# Patient Record
Sex: FEMALE | Race: OTHER | Employment: FULL TIME | URBAN - METROPOLITAN AREA
[De-identification: names, ages, dates, MRNs, and addresses within clinical notes are randomized per-mention and may not be internally consistent; named-entity substitution may affect disease eponyms.]

---

## 2023-02-08 ENCOUNTER — OFFICE VISIT (OUTPATIENT)
Dept: PRIMARY CARE CLINIC | Age: 28
End: 2023-02-08
Payer: COMMERCIAL

## 2023-02-08 VITALS
SYSTOLIC BLOOD PRESSURE: 90 MMHG | BODY MASS INDEX: 22.43 KG/M2 | HEIGHT: 68 IN | WEIGHT: 148 LBS | HEART RATE: 83 BPM | RESPIRATION RATE: 16 BRPM | DIASTOLIC BLOOD PRESSURE: 58 MMHG | OXYGEN SATURATION: 97 % | TEMPERATURE: 98.7 F

## 2023-02-08 DIAGNOSIS — N92.6 IRREGULAR PERIODS: Primary | ICD-10-CM

## 2023-02-08 DIAGNOSIS — Z76.89 ENCOUNTER TO ESTABLISH CARE WITH NEW DOCTOR: ICD-10-CM

## 2023-02-08 PROCEDURE — 99203 OFFICE O/P NEW LOW 30 MIN: CPT | Performed by: FAMILY MEDICINE

## 2023-02-08 SDOH — ECONOMIC STABILITY: FOOD INSECURITY: WITHIN THE PAST 12 MONTHS, THE FOOD YOU BOUGHT JUST DIDN'T LAST AND YOU DIDN'T HAVE MONEY TO GET MORE.: NEVER TRUE

## 2023-02-08 SDOH — ECONOMIC STABILITY: INCOME INSECURITY: HOW HARD IS IT FOR YOU TO PAY FOR THE VERY BASICS LIKE FOOD, HOUSING, MEDICAL CARE, AND HEATING?: NOT HARD AT ALL

## 2023-02-08 SDOH — ECONOMIC STABILITY: FOOD INSECURITY: WITHIN THE PAST 12 MONTHS, YOU WORRIED THAT YOUR FOOD WOULD RUN OUT BEFORE YOU GOT MONEY TO BUY MORE.: NEVER TRUE

## 2023-02-08 SDOH — ECONOMIC STABILITY: HOUSING INSECURITY
IN THE LAST 12 MONTHS, WAS THERE A TIME WHEN YOU DID NOT HAVE A STEADY PLACE TO SLEEP OR SLEPT IN A SHELTER (INCLUDING NOW)?: NO

## 2023-02-08 ASSESSMENT — ENCOUNTER SYMPTOMS
DIARRHEA: 0
WHEEZING: 0
EYE PAIN: 0
ABDOMINAL PAIN: 0
ABDOMINAL DISTENTION: 0
EYE DISCHARGE: 0
RHINORRHEA: 0
CONSTIPATION: 0
BACK PAIN: 0
COUGH: 0
SHORTNESS OF BREATH: 0
VOMITING: 0
COLOR CHANGE: 0
SORE THROAT: 0
SINUS PRESSURE: 0
CHEST TIGHTNESS: 0

## 2023-02-08 ASSESSMENT — PATIENT HEALTH QUESTIONNAIRE - PHQ9
1. LITTLE INTEREST OR PLEASURE IN DOING THINGS: 0
SUM OF ALL RESPONSES TO PHQ9 QUESTIONS 1 & 2: 0
SUM OF ALL RESPONSES TO PHQ QUESTIONS 1-9: 0
2. FEELING DOWN, DEPRESSED OR HOPELESS: 0
SUM OF ALL RESPONSES TO PHQ QUESTIONS 1-9: 0

## 2023-02-08 NOTE — PROGRESS NOTES
[facility]        SUBJECTIVE:  CC: had concerns including New Patient (Pt just wants a check up, since she is [place] to the country. ). Janett Tovar presented to the clinic for a new patient visit. Reports she has been trying to get pregnant for over a year. She is following with Dr. Neeta Nassar for OBGYN. She has a history of irregular periods. Review of Systems   Constitutional:  Negative for activity change, appetite change, fatigue and fever. HENT:  Negative for congestion, postnasal drip, rhinorrhea, sinus pressure, sneezing and sore throat. Eyes:  Negative for pain and discharge. Respiratory:  Negative for cough, chest tightness, shortness of breath and wheezing. Cardiovascular:  Negative for chest pain, palpitations and leg swelling. Gastrointestinal:  Negative for abdominal distention, abdominal pain, constipation, diarrhea and vomiting. Endocrine: Negative for cold intolerance and heat intolerance. Genitourinary:  Negative for decreased urine volume, frequency and urgency. Musculoskeletal:  Negative for arthralgias and back pain. Skin:  Negative for color change and rash. Allergic/Immunologic: Negative for food allergies and immunocompromised state. Neurological:  Negative for dizziness, syncope, weakness, numbness and headaches. Psychiatric/Behavioral:  Negative for agitation and behavioral problems. The patient is not nervous/anxious. No outpatient medications have been marked as taking for the 2/8/23 encounter (Office Visit) with Jeanette Angela MD.       No past medical history on file. No past surgical history on file. No family history on file. No Known Allergies    Social History:  TOBACCO:   reports that she has never smoked. She has never been exposed to tobacco smoke. She has never used smokeless tobacco.  ETOH:   reports current alcohol use.     OBJECTIVE    VS: BP (!) 90/58   Pulse 83   Temp 98.7 °F (37.1 °C) Resp 16   Ht 5' 8\" (1.727 m)   Wt 148 lb (67.1 kg)   LMP 01/15/2023   SpO2 97%   BMI 22.50 kg/m²   Physical Exam  Constitutional:       General: She is not in acute distress. Appearance: She is well-developed. She is not diaphoretic. HENT:      Head: Normocephalic and atraumatic. Eyes:      Conjunctiva/sclera: Conjunctivae normal.      Pupils: Pupils are equal, round, and reactive to light. Cardiovascular:      Rate and Rhythm: Normal rate and regular rhythm. Pulmonary:      Effort: Pulmonary effort is normal.      Breath sounds: Normal breath sounds. Abdominal:      General: Bowel sounds are normal. There is no distension. Palpations: Abdomen is soft. Tenderness: There is no abdominal tenderness. Hernia: No hernia is present. Musculoskeletal:      Cervical back: Normal range of motion and neck supple. Skin:     General: Skin is warm and dry. Neurological:      Mental Status: She is alert and oriented to person, place, and time. ASSESSMENT/PLAN:  1. Irregular periods  - CBC; Future  - Comprehensive Metabolic Panel; Future  - TSH; Future    2. Encounter to establish care with new doctor    I have reviewed my findings and recommendations with Cynthia Ly MD  2/23/2023 9:41 PM    Counseled regarding above diagnosis, including possible risks and complications, especially if left uncontrolled. Discussed medications risk/benefits and possible side effects and alternatives to treatment. Patient and/or guardian verbalizes understanding, agrees, feels comfortable with and wishes to proceed with above treatment plan. Advised patient regarding importance of keeping up with recommended health maintenance and to schedule as soon as possible if overdue, as this is important in assessing for undiagnosed pathology, especially cancer, as well as protecting against potentially harmful/life threatening disease.        Patient and/or guardian verbalizes understanding and agrees with above counseling, assessment and plan. All questions answered. Please note this report has been partially produced using speech recognition software  and may contain errors related to that system including grammar, punctuation and spelling as well as words and phrases that may seem inappropriate. If there are questions or concerns please feel free to contact me to clarify.

## 2023-04-17 ENCOUNTER — OFFICE VISIT (OUTPATIENT)
Dept: PRIMARY CARE CLINIC | Age: 28
End: 2023-04-17
Payer: COMMERCIAL

## 2023-04-17 VITALS
WEIGHT: 153 LBS | BODY MASS INDEX: 24.59 KG/M2 | HEIGHT: 66 IN | HEART RATE: 79 BPM | DIASTOLIC BLOOD PRESSURE: 61 MMHG | TEMPERATURE: 97.9 F | SYSTOLIC BLOOD PRESSURE: 99 MMHG | RESPIRATION RATE: 18 BRPM | OXYGEN SATURATION: 100 %

## 2023-04-17 DIAGNOSIS — J01.00 ACUTE NON-RECURRENT MAXILLARY SINUSITIS: Primary | ICD-10-CM

## 2023-04-17 PROCEDURE — 99213 OFFICE O/P EST LOW 20 MIN: CPT | Performed by: NURSE PRACTITIONER

## 2023-04-17 RX ORDER — AMOXICILLIN 500 MG/1
500 CAPSULE ORAL 3 TIMES DAILY
Qty: 21 CAPSULE | Refills: 0 | Status: SHIPPED
Start: 2023-04-17 | End: 2023-04-17

## 2023-04-17 RX ORDER — AMOXICILLIN 500 MG/1
500 CAPSULE ORAL 3 TIMES DAILY
Qty: 21 CAPSULE | Refills: 0 | Status: SHIPPED | OUTPATIENT
Start: 2023-04-17 | End: 2023-04-24

## 2023-04-17 NOTE — PROGRESS NOTES
Chief Complaint:   Sinusitis (Started 2-3 days ago, green mucus production. Has taken OTC medications. )    History of Present Illness   Source of history provided by:  patient. Cristel Greco is a 32 y.o. old female who presents to walk-in for evaluation of sinus pressure, nasal congestion, discolored nasal drainage, productive cough and sore throat x 7 days. Has been taking nothing for the symptoms. Denies any fever, chills, wheezing, CP, SOB, or GI symptoms. Denies any hx of asthma, COPD, or tobacco use. Review of Systems   Unless otherwise stated in this report or unable to obtain because of the patient's clinical or mental status as evidenced by the medical record, this patients's positive and negative responses for Review of Systems, constitutional, psych, eyes, ENT, cardiovascular, respiratory, gastrointestinal, neurological, genitourinary, musculoskeletal, integument systems and systems related to the presenting problem are either stated in the preceding or were negative for the symptoms and/or complaints related to the medical problem. Past Medical History:  has no past medical history on file. Past Surgical History:  has no past surgical history on file. Social History:  reports that she has never smoked. She has never been exposed to tobacco smoke. She has never used smokeless tobacco. She reports current alcohol use. She reports that she does not use drugs. Family History: family history is not on file. Allergies: Patient has no known allergies. Physical Exam   Vital Signs:  BP 99/61   Pulse 79   Temp 97.9 °F (36.6 °C) (Oral)   Resp 18   Ht 5' 6\" (1.676 m)   Wt 153 lb (69.4 kg)   LMP 04/14/2023 (Exact Date)   SpO2 100%   BMI 24.69 kg/m²    Oxygen Saturation Interpretation: Normal.    Constitutional:  Alert, development consistent with age. Head: There is mild TTP over the maxillary sinuses.   Ears: Bilateral pinna normal. TMs clear without erythema or perforation

## 2024-02-14 ENCOUNTER — OFFICE VISIT (OUTPATIENT)
Dept: PRIMARY CARE CLINIC | Age: 29
End: 2024-02-14
Payer: COMMERCIAL

## 2024-02-14 VITALS
BODY MASS INDEX: 24.83 KG/M2 | SYSTOLIC BLOOD PRESSURE: 96 MMHG | TEMPERATURE: 98.5 F | RESPIRATION RATE: 16 BRPM | OXYGEN SATURATION: 99 % | WEIGHT: 149 LBS | HEART RATE: 68 BPM | DIASTOLIC BLOOD PRESSURE: 55 MMHG | HEIGHT: 65 IN

## 2024-02-14 DIAGNOSIS — J02.9 SORE THROAT: ICD-10-CM

## 2024-02-14 DIAGNOSIS — J06.9 ACUTE URI: Primary | ICD-10-CM

## 2024-02-14 LAB
INFLUENZA A ANTIBODY: NEGATIVE
INFLUENZA B ANTIBODY: NEGATIVE
S PYO AG THROAT QL: NORMAL

## 2024-02-14 PROCEDURE — 87804 INFLUENZA ASSAY W/OPTIC: CPT | Performed by: NURSE PRACTITIONER

## 2024-02-14 PROCEDURE — 99213 OFFICE O/P EST LOW 20 MIN: CPT | Performed by: NURSE PRACTITIONER

## 2024-02-14 PROCEDURE — 87880 STREP A ASSAY W/OPTIC: CPT | Performed by: NURSE PRACTITIONER

## 2024-02-14 RX ORDER — AZITHROMYCIN 250 MG/1
250 TABLET, FILM COATED ORAL SEE ADMIN INSTRUCTIONS
Qty: 6 TABLET | Refills: 0 | Status: SHIPPED | OUTPATIENT
Start: 2024-02-14 | End: 2024-02-19

## 2024-02-14 RX ORDER — BROMPHENIRAMINE MALEATE, PSEUDOEPHEDRINE HYDROCHLORIDE, AND DEXTROMETHORPHAN HYDROBROMIDE 2; 30; 10 MG/5ML; MG/5ML; MG/5ML
5 SYRUP ORAL 4 TIMES DAILY PRN
Qty: 120 ML | Refills: 0 | Status: SHIPPED | OUTPATIENT
Start: 2024-02-14 | End: 2024-02-19

## 2024-02-14 NOTE — PROGRESS NOTES
Chief Complaint:   URI (Cough, sore throat, head pressure, fever 101.8 yesterday, body aches- ongoing for 3 days /Negative at home covid today)    History of Present Illness   Source of history provided by:  patient.    Angella Smith is a 28 y.o. old female who presents to walk-in for sore throat, which began 3 day(s) prior to arrival. The symptoms are associated with fever (Tmax 101.8F), sinus congestion, nasal congestion, rhinorrhea and dry cough.  There has been NO chills, N/V/D, chest pain or SOB. Denies any hx of asthma, COPD, or tobacco use.    Review of Systems   Unless otherwise stated in this report or unable to obtain because of the patient's clinical or mental status as evidenced by the medical record, this patients's positive and negative responses for Review of Systems, constitutional, psych, eyes, ENT, cardiovascular, respiratory, gastrointestinal, neurological, genitourinary, musculoskeletal, integument systems and systems related to the presenting problem are either stated in the preceding or were not pertinent or were negative for the symptoms and/or complaints related to the medical problem.    Past Medical History:  has no past medical history on file.   Past Surgical History:  has no past surgical history on file.  Social History:  reports that she has never smoked. She has never been exposed to tobacco smoke. She has never used smokeless tobacco. She reports current alcohol use. She reports that she does not use drugs.  Family History: family history is not on file.  Allergies: Patient has no known allergies.    Physical Exam   Vital Signs:  BP (!) 96/55   Pulse 68   Temp 98.5 °F (36.9 °C) (Oral)   Resp 16   Ht 1.651 m (5' 5\")   Wt 67.6 kg (149 lb)   LMP 02/12/2024   SpO2 99%   BMI 24.79 kg/m²    Oxygen Saturation Interpretation: Normal.    Constitutional:  Alert, development consistent with age.  Ears: Bilateral pinna normal. TMs clear without erythema or perforation bilaterally.

## 2024-07-31 PROBLEM — O46.8X1 SUBCHORIONIC HEMATOMA IN FIRST TRIMESTER: Status: ACTIVE | Noted: 2024-07-31

## 2024-07-31 PROBLEM — O41.8X10 SUBCHORIONIC HEMATOMA IN FIRST TRIMESTER: Status: ACTIVE | Noted: 2024-07-31

## 2025-02-05 PROBLEM — Z3A.39 39 WEEKS GESTATION OF PREGNANCY: Status: ACTIVE | Noted: 2025-02-05

## 2025-02-08 ENCOUNTER — ANESTHESIA EVENT (OUTPATIENT)
Dept: LABOR AND DELIVERY | Age: 30
End: 2025-02-08
Payer: COMMERCIAL

## 2025-02-08 ENCOUNTER — HOSPITAL ENCOUNTER (INPATIENT)
Age: 30
LOS: 2 days | Discharge: HOME | End: 2025-02-10
Attending: OBSTETRICS & GYNECOLOGY | Admitting: OBSTETRICS & GYNECOLOGY
Payer: COMMERCIAL

## 2025-02-08 ENCOUNTER — ANESTHESIA (OUTPATIENT)
Dept: LABOR AND DELIVERY | Age: 30
End: 2025-02-08
Payer: COMMERCIAL

## 2025-02-08 PROBLEM — Z64.1 MULTIPARITY: Status: ACTIVE | Noted: 2025-02-08

## 2025-02-08 LAB
ABO + RH BLD: NORMAL
AMPHET UR QL SCN: NEGATIVE
ARM BAND NUMBER: NORMAL
BARBITURATES UR QL SCN: NEGATIVE
BENZODIAZ UR QL: NEGATIVE
BLOOD BANK SAMPLE EXPIRATION: NORMAL
BLOOD GROUP ANTIBODIES SERPL: NEGATIVE
BUPRENORPHINE UR QL: NEGATIVE
CANNABINOIDS UR QL SCN: NEGATIVE
COCAINE UR QL SCN: NEGATIVE
ERYTHROCYTE [DISTWIDTH] IN BLOOD BY AUTOMATED COUNT: 12.9 % (ref 11.5–15)
FENTANYL UR QL: NEGATIVE
HCT VFR BLD AUTO: 37.7 % (ref 34–48)
HGB BLD-MCNC: 12.3 G/DL (ref 11.5–15.5)
MCH RBC QN AUTO: 31.5 PG (ref 26–35)
MCHC RBC AUTO-ENTMCNC: 32.6 G/DL (ref 32–34.5)
MCV RBC AUTO: 96.4 FL (ref 80–99.9)
METHADONE UR QL: NEGATIVE
OPIATES UR QL SCN: NEGATIVE
OXYCODONE UR QL SCN: NEGATIVE
PCP UR QL SCN: NEGATIVE
PLATELET # BLD AUTO: 251 K/UL (ref 130–450)
PMV BLD AUTO: 11.2 FL (ref 7–12)
RBC # BLD AUTO: 3.91 M/UL (ref 3.5–5.5)
TEST INFORMATION: NORMAL
WBC OTHER # BLD: 18 K/UL (ref 4.5–11.5)

## 2025-02-08 PROCEDURE — 6360000002 HC RX W HCPCS: Performed by: OBSTETRICS & GYNECOLOGY

## 2025-02-08 PROCEDURE — 86850 RBC ANTIBODY SCREEN: CPT

## 2025-02-08 PROCEDURE — 7200000001 HC VAGINAL DELIVERY

## 2025-02-08 PROCEDURE — 86901 BLOOD TYPING SEROLOGIC RH(D): CPT

## 2025-02-08 PROCEDURE — 80307 DRUG TEST PRSMV CHEM ANLYZR: CPT

## 2025-02-08 PROCEDURE — 3700000025 EPIDURAL BLOCK: Performed by: ANESTHESIOLOGY

## 2025-02-08 PROCEDURE — 2580000003 HC RX 258: Performed by: OBSTETRICS & GYNECOLOGY

## 2025-02-08 PROCEDURE — 1220000000 HC SEMI PRIVATE OB R&B

## 2025-02-08 PROCEDURE — 51701 INSERT BLADDER CATHETER: CPT

## 2025-02-08 PROCEDURE — 2500000003 HC RX 250 WO HCPCS: Performed by: ANESTHESIOLOGY

## 2025-02-08 PROCEDURE — 85027 COMPLETE CBC AUTOMATED: CPT

## 2025-02-08 PROCEDURE — 86900 BLOOD TYPING SEROLOGIC ABO: CPT

## 2025-02-08 RX ORDER — ACETAMINOPHEN 325 MG/1
650 TABLET ORAL EVERY 4 HOURS PRN
Status: DISCONTINUED | OUTPATIENT
Start: 2025-02-08 | End: 2025-02-10 | Stop reason: HOSPADM

## 2025-02-08 RX ORDER — ACETAMINOPHEN 650 MG
TABLET, EXTENDED RELEASE ORAL
Status: DISPENSED
Start: 2025-02-08 | End: 2025-02-09

## 2025-02-08 RX ORDER — SODIUM CHLORIDE, SODIUM LACTATE, POTASSIUM CHLORIDE, AND CALCIUM CHLORIDE .6; .31; .03; .02 G/100ML; G/100ML; G/100ML; G/100ML
500 INJECTION, SOLUTION INTRAVENOUS PRN
Status: DISCONTINUED | OUTPATIENT
Start: 2025-02-08 | End: 2025-02-10 | Stop reason: HOSPADM

## 2025-02-08 RX ORDER — TERBUTALINE SULFATE 1 MG/ML
0.25 INJECTION, SOLUTION SUBCUTANEOUS
Status: ACTIVE | OUTPATIENT
Start: 2025-02-08 | End: 2025-02-09

## 2025-02-08 RX ORDER — CARBOPROST TROMETHAMINE 250 UG/ML
250 INJECTION, SOLUTION INTRAMUSCULAR PRN
Status: DISCONTINUED | OUTPATIENT
Start: 2025-02-08 | End: 2025-02-10 | Stop reason: HOSPADM

## 2025-02-08 RX ORDER — SODIUM CHLORIDE 0.9 % (FLUSH) 0.9 %
5-40 SYRINGE (ML) INJECTION PRN
Status: DISCONTINUED | OUTPATIENT
Start: 2025-02-08 | End: 2025-02-10 | Stop reason: HOSPADM

## 2025-02-08 RX ORDER — SODIUM CHLORIDE 9 MG/ML
INJECTION, SOLUTION INTRAVENOUS PRN
Status: DISCONTINUED | OUTPATIENT
Start: 2025-02-08 | End: 2025-02-10 | Stop reason: HOSPADM

## 2025-02-08 RX ORDER — BUTORPHANOL TARTRATE 2 MG/ML
1 INJECTION, SOLUTION INTRAMUSCULAR; INTRAVENOUS
Status: DISCONTINUED | OUTPATIENT
Start: 2025-02-08 | End: 2025-02-10 | Stop reason: HOSPADM

## 2025-02-08 RX ORDER — NALOXONE HYDROCHLORIDE 0.4 MG/ML
INJECTION, SOLUTION INTRAMUSCULAR; INTRAVENOUS; SUBCUTANEOUS PRN
Status: DISCONTINUED | OUTPATIENT
Start: 2025-02-08 | End: 2025-02-10 | Stop reason: HOSPADM

## 2025-02-08 RX ORDER — IBUPROFEN 800 MG/1
800 TABLET, FILM COATED ORAL EVERY 8 HOURS SCHEDULED
Status: DISCONTINUED | OUTPATIENT
Start: 2025-02-09 | End: 2025-02-10 | Stop reason: HOSPADM

## 2025-02-08 RX ORDER — DOCUSATE SODIUM 100 MG/1
100 CAPSULE, LIQUID FILLED ORAL 2 TIMES DAILY
Status: DISCONTINUED | OUTPATIENT
Start: 2025-02-08 | End: 2025-02-10 | Stop reason: HOSPADM

## 2025-02-08 RX ORDER — SODIUM CHLORIDE, SODIUM LACTATE, POTASSIUM CHLORIDE, CALCIUM CHLORIDE 600; 310; 30; 20 MG/100ML; MG/100ML; MG/100ML; MG/100ML
INJECTION, SOLUTION INTRAVENOUS CONTINUOUS
Status: DISCONTINUED | OUTPATIENT
Start: 2025-02-08 | End: 2025-02-10 | Stop reason: HOSPADM

## 2025-02-08 RX ORDER — ONDANSETRON 2 MG/ML
4 INJECTION INTRAMUSCULAR; INTRAVENOUS EVERY 6 HOURS PRN
Status: DISCONTINUED | OUTPATIENT
Start: 2025-02-08 | End: 2025-02-10 | Stop reason: HOSPADM

## 2025-02-08 RX ORDER — ONDANSETRON 4 MG/1
4 TABLET, ORALLY DISINTEGRATING ORAL EVERY 6 HOURS PRN
Status: DISCONTINUED | OUTPATIENT
Start: 2025-02-08 | End: 2025-02-10 | Stop reason: HOSPADM

## 2025-02-08 RX ORDER — HYDROCODONE BITARTRATE AND ACETAMINOPHEN 5; 325 MG/1; MG/1
1 TABLET ORAL EVERY 6 HOURS PRN
Status: DISCONTINUED | OUTPATIENT
Start: 2025-02-08 | End: 2025-02-10 | Stop reason: HOSPADM

## 2025-02-08 RX ORDER — FERROUS SULFATE 325(65) MG
325 TABLET ORAL EVERY OTHER DAY
Status: DISCONTINUED | OUTPATIENT
Start: 2025-02-09 | End: 2025-02-10 | Stop reason: HOSPADM

## 2025-02-08 RX ORDER — SODIUM CHLORIDE 9 MG/ML
25 INJECTION, SOLUTION INTRAVENOUS PRN
Status: DISCONTINUED | OUTPATIENT
Start: 2025-02-08 | End: 2025-02-08 | Stop reason: SDUPTHER

## 2025-02-08 RX ORDER — LIDOCAINE HYDROCHLORIDE 10 MG/ML
INJECTION, SOLUTION INFILTRATION; PERINEURAL
Status: DISPENSED
Start: 2025-02-08 | End: 2025-02-09

## 2025-02-08 RX ORDER — SODIUM CHLORIDE 0.9 % (FLUSH) 0.9 %
5-40 SYRINGE (ML) INJECTION PRN
Status: DISCONTINUED | OUTPATIENT
Start: 2025-02-08 | End: 2025-02-08 | Stop reason: SDUPTHER

## 2025-02-08 RX ORDER — MISOPROSTOL 200 UG/1
400 TABLET ORAL PRN
Status: DISCONTINUED | OUTPATIENT
Start: 2025-02-08 | End: 2025-02-10 | Stop reason: HOSPADM

## 2025-02-08 RX ORDER — METHYLERGONOVINE MALEATE 0.2 MG/ML
200 INJECTION INTRAVENOUS PRN
Status: DISCONTINUED | OUTPATIENT
Start: 2025-02-08 | End: 2025-02-10 | Stop reason: HOSPADM

## 2025-02-08 RX ORDER — SODIUM CHLORIDE 0.9 % (FLUSH) 0.9 %
5-40 SYRINGE (ML) INJECTION EVERY 12 HOURS SCHEDULED
Status: DISCONTINUED | OUTPATIENT
Start: 2025-02-08 | End: 2025-02-08 | Stop reason: SDUPTHER

## 2025-02-08 RX ORDER — SODIUM CHLORIDE 0.9 % (FLUSH) 0.9 %
5-40 SYRINGE (ML) INJECTION EVERY 12 HOURS SCHEDULED
Status: DISCONTINUED | OUTPATIENT
Start: 2025-02-08 | End: 2025-02-10 | Stop reason: HOSPADM

## 2025-02-08 RX ORDER — MODIFIED LANOLIN
OINTMENT (GRAM) TOPICAL PRN
Status: DISCONTINUED | OUTPATIENT
Start: 2025-02-08 | End: 2025-02-10 | Stop reason: HOSPADM

## 2025-02-08 RX ORDER — ACETAMINOPHEN 500 MG
1000 TABLET ORAL EVERY 8 HOURS SCHEDULED
Status: DISCONTINUED | OUTPATIENT
Start: 2025-02-08 | End: 2025-02-10 | Stop reason: HOSPADM

## 2025-02-08 RX ORDER — PENICILLIN G 3000000 [IU]/50ML
3 INJECTION, SOLUTION INTRAVENOUS EVERY 4 HOURS
Status: DISCONTINUED | OUTPATIENT
Start: 2025-02-08 | End: 2025-02-09

## 2025-02-08 RX ORDER — TRANEXAMIC ACID 10 MG/ML
1000 INJECTION, SOLUTION INTRAVENOUS
Status: ACTIVE | OUTPATIENT
Start: 2025-02-08 | End: 2025-02-09

## 2025-02-08 RX ADMIN — Medication 5 ML: at 14:10

## 2025-02-08 RX ADMIN — DEXTROSE MONOHYDRATE 5 MILLION UNITS: 50 INJECTION, SOLUTION INTRAVENOUS at 13:22

## 2025-02-08 RX ADMIN — SODIUM CHLORIDE, POTASSIUM CHLORIDE, SODIUM LACTATE AND CALCIUM CHLORIDE: 600; 310; 30; 20 INJECTION, SOLUTION INTRAVENOUS at 17:59

## 2025-02-08 RX ADMIN — Medication 166.7 ML: at 18:38

## 2025-02-08 RX ADMIN — SODIUM CHLORIDE, POTASSIUM CHLORIDE, SODIUM LACTATE AND CALCIUM CHLORIDE: 600; 310; 30; 20 INJECTION, SOLUTION INTRAVENOUS at 13:30

## 2025-02-08 RX ADMIN — PENICILLIN G 3 MILLION UNITS: 3000000 INJECTION, SOLUTION INTRAVENOUS at 17:22

## 2025-02-08 RX ADMIN — Medication 15 ML/HR: at 14:13

## 2025-02-08 NOTE — L&D DELIVERY SUMMARY NOTE
Vaginal Delivery Note    Details of Procedure:   The patient is a 29 y.o. female at 39w5d   OB History          3    Para   1    Term   1            AB   1    Living             SAB   1    IAB        Ectopic        Molar        Multiple        Live Births   1             who was admitted for active phase labor. She received the following interventions: ARBOW She was known to be GBS positive and did receive antibiotic prophylaxis. The patient progressed well,did receive an epidural, became complete and started to push. After pushing for 3 contractions the fetal head was at the perineum, and the rest of the infant delivered atraumatically, placed on mother abdomen. Cord was clamped and cut and infant was placed skin to skin. The delivery of the placenta was spontaneous. The perineum and vagina were explored and found to be intact.    Male infant delivered at 1834, Apgars of 9 and 9. Weight pending    Anesthesia:  epidural anesthesia    Estimated blood loss:  100ml    Specimen:  Placenta not sent to pathology     Cord blood sent Yes    Complications:  none    Condition:  infant stable to general nursery    Dr. Harvey present at delivery.     LIZBETH Mancia CNM

## 2025-02-08 NOTE — PROGRESS NOTES
Notified Ade FERGUSON that patient is comfortable with epidural. Robin q1-3minutes, sve 7/90/0 with bulging bag. Notified next dose of penicillin is due at 1730. States she will head in to unit.

## 2025-02-08 NOTE — PROGRESS NOTES
Patient placed back on efm after epidural placement. Blood pressures cycling, pulse ox continuous. RN and anesthesia at bedside.

## 2025-02-08 NOTE — ANESTHESIA PROCEDURE NOTES
Epidural Block    Patient location during procedure: OB  Start time: 2/8/2025 2:05 PM  End time: 2/8/2025 2:10 PM  Reason for block: labor epidural  Staffing  Performed: other anesthesia staff   Anesthesiologist: Aristides Esquivel MD  Resident/CRNA: Rodo Núñez APRN - ELAINA  Other anesthesia staff: Tucker Davila RN  Performed by: Tucker Davila RN  Authorized by: Aristides Esquivel MD    Epidural  Patient position: sitting  Prep: ChloraPrep  Patient monitoring: continuous pulse ox and frequent blood pressure checks  Approach: midline  Location: L3-4  Injection technique: NIKKI air  Provider prep: mask and sterile gloves  Needle  Needle type: Tuohy   Needle gauge: 17 G  Needle length: 3.5 in  Needle insertion depth: 5 cm  Catheter type: end hole  Catheter size: 20 G  Catheter at skin depth: 11 cm  Test dose: negativeCatheter Secured: tegaderm and tape  Assessment  Hemodynamics: stable  Attempts: 1  Outcomes: uncomplicated and patient tolerated procedure well  Preanesthetic Checklist  Completed: patient identified, IV checked, site marked, risks and benefits discussed, surgical/procedural consents, equipment checked, pre-op evaluation, timeout performed, anesthesia consent given, oxygen available and monitors applied/VS acknowledged

## 2025-02-08 NOTE — PROGRESS NOTES
39w5d presented for contractions that she has been having for a few days and got worse this morning, patient states she is now feeling them every 5 minutes. Denies leaking of fluid, vaginal bleeding. Perceives positive fetal movement.

## 2025-02-08 NOTE — PROGRESS NOTES
Patient taken off efm for epidural placement. RN and anesthesia at bedside. Blood pressure cuff applied, pulse ox continuous.

## 2025-02-08 NOTE — PROGRESS NOTES
Patient called out thinking her water broke.   SVE 8cm/100/-1  Bulging bag  Patient comfortable with epidural but feeling pressure.   Holding off on AROM due to + gbs  Category 1 FHR tracing  Ctx 2-3

## 2025-02-08 NOTE — ANESTHESIA PRE PROCEDURE
Department of Anesthesiology  Preprocedure Note       Name:  Angella Smith   Age:  29 y.o.  :  1995                                          MRN:  [id number]         Date:  2025        Procedure: Epidural cathter placement    Medications prior to admission:   Prior to Admission medications    Medication Sig Start Date End Date Taking? Authorizing Provider   DHA-EPA-Vitamin E (OMEGA-3 COMPLEX) 192-251-11 MG-MG-UNIT CAPS Take by mouth   Yes Provider, MD Nika   Prenatal Vit-Fe Fumarate-FA (PNV WITH CA,NO.73-IRON-FA) TABS Take 1 tablet by mouth daily 24  Yes Toshia Harvey MD       Current medications:    Current Facility-Administered Medications   Medication Dose Route Frequency Provider Last Rate Last Admin    terbutaline (BRETHINE) injection 0.25 mg  0.25 mg SubCUTAneous Once PRN Natanael Houston MD        lactated ringers infusion   IntraVENous Continuous Natanael Houston  mL/hr at 25 1330 New Bag at 25 1330    lactated ringers bolus 500 mL  500 mL IntraVENous PRN Natanael Houston MD        Or    lactated ringers bolus 500 mL  500 mL IntraVENous PRN Natanael Houston MD        sodium chloride flush 0.9 % injection 5-40 mL  5-40 mL IntraVENous 2 times per day Natanael Houston MD        sodium chloride flush 0.9 % injection 5-40 mL  5-40 mL IntraVENous PRN Natanael Houston MD        0.9 % sodium chloride infusion  25 mL IntraVENous PRN Natanael Houston MD        ondansetron (ZOFRAN) injection 4 mg  4 mg IntraVENous Q6H PRN Natanael Houston MD        Or    ondansetron (ZOFRAN-ODT) disintegrating tablet 4 mg  4 mg Oral Q6H PRN Natanael Houston MD        oxytocin (PITOCIN) 30 units in 500 mL infusion  87.3 miquel-units/min IntraVENous Continuous PRNatanael Merino MD        And    oxytocin (PITOCIN) 10 unit bolus from the bag  10 Units IntraVENous PRN Natanael Houston MD        methylergonovine (METHERGINE) injection 200 mcg  200 mcg

## 2025-02-08 NOTE — H&P
Department of Obstetrics and Gynecology  Attending Obstetrics History and Physical      HISTORY OF PRESENT ILLNESS:      The patient is a 29 y.o.  3 parity 1 at 39 weeks 5 days.  Complaining of ctx's    Estimated Due Date:  2/10/25  Contractions:  Yes  Leaking of fluid: no  nfm  Blleeding:  No    PRENATAL CARE:    Complications: No  Group B Strep: +    Active Problems:    * No active hospital problems. *  Resolved Problems:    * No resolved hospital problems. *        PAST OB HISTORY    OB History    Para Term  AB Living   3 1 1   1     SAB IAB Ectopic Molar Multiple Live Births   1         1      # Outcome Date GA Lbr Lucio/2nd Weight Sex Type Anes PTL Lv   3 Current            2 SAB            1 Term                    Pre-eclampsia:  No      :  No      D & C:  No      Cerclage:  No      LEEP:  No      Myomectomy:  No       Labor: No    Past Medical History:    No past medical history on file.     Past Surgical History:    No past surgical history on file.     Past Family History:  No family history on file.    ROS:  Const: No fever, chills, night sweats, no recent unexplained weight gain/loss  HEENT: No blurred vision, double vision; no ear problems; no sore throat, congestion; no running nose.  Resp: No cough, no sputum, no pleuritic chest pain, no sob  Cardio: No chest pain, no exertional dyspnea, no PND, no orthopnea, no palpitation, no leg swelling.   GI: No dysphagia, no reflux; no abdominal pain, no n/v; no c/d. No hematochezia    : No dysuria, no frequency, hesitancy; no hematuria  MSK: no joint pain, no myalgia, no change in ROM  Neuro: no focal weakness in extremities, no slurred speech, no double vision, no numbness or tingling in extremities  Endo: no heat/cold intolerance, no polyphagia, polydipsia or polyuria  Hem: no increased bleeding, no bruising, no lymphadenopathy  Skin: no skin changes  Psych: no depressed mood, no suicidal ideation    Social History:

## 2025-02-09 LAB
HCT VFR BLD AUTO: 33.7 % (ref 34–48)
HGB BLD-MCNC: 10.9 G/DL (ref 11.5–15.5)

## 2025-02-09 PROCEDURE — 85014 HEMATOCRIT: CPT

## 2025-02-09 PROCEDURE — 6370000000 HC RX 637 (ALT 250 FOR IP)

## 2025-02-09 PROCEDURE — 1220000000 HC SEMI PRIVATE OB R&B

## 2025-02-09 PROCEDURE — 2500000003 HC RX 250 WO HCPCS

## 2025-02-09 PROCEDURE — 85018 HEMOGLOBIN: CPT

## 2025-02-09 RX ADMIN — SODIUM CHLORIDE, PRESERVATIVE FREE 10 ML: 5 INJECTION INTRAVENOUS at 01:43

## 2025-02-09 RX ADMIN — IBUPROFEN 800 MG: 800 TABLET, FILM COATED ORAL at 21:53

## 2025-02-09 RX ADMIN — DOCUSATE SODIUM 100 MG: 100 CAPSULE, LIQUID FILLED ORAL at 07:54

## 2025-02-09 ASSESSMENT — PAIN SCALES - GENERAL
PAINLEVEL_OUTOF10: 3
PAINLEVEL_OUTOF10: 3
PAINLEVEL_OUTOF10: 0

## 2025-02-09 NOTE — LACTATION NOTE
Introduced myself to patient. Breastfeeding is going well so far. We discussed her experience feeding her first baby along with her goals for feeding this baby. I provided her with the breastfeeding book and offered breastfeeding education, which she declined. She said she will read through the book on her own. She had questions about the best time to start pumping. She does plan on going back to work. I recommended focusing on mainly latching the first 2-3 weeks and introducing a pumping schedule around week 2 or 3, if possible. I also recommended a passive  as an easy way to collect extra milk in the early days. I encouraged her to call if she needs any assistance today.

## 2025-02-09 NOTE — PLAN OF CARE
Problem: Pain  Goal: Verbalizes/displays adequate comfort level or baseline comfort level  2/9/2025 1128 by Shayna Miranda RN  Flowsheets  Taken 2/9/2025 1128  Verbalizes/displays adequate comfort level or baseline comfort level:   Encourage patient to monitor pain and request assistance   Assess pain using appropriate pain scale

## 2025-02-09 NOTE — ANESTHESIA POSTPROCEDURE EVALUATION
Department of Anesthesiology  Postprocedure Note    Patient: Angella Smith  MRN: [id number]  YOB: 1995  Date of evaluation: 2/9/2025    Procedure Summary       Date: 02/08/25 Room / Location:     Anesthesia Start: 1405 Anesthesia Stop: 1834    Procedure: Labor Analgesia Diagnosis:     Scheduled Providers:  Responsible Provider: Aristides Esquivel MD    Anesthesia Type: epidural ASA Status: 1            Anesthesia Type: No value filed.    Anirudh Phase I:      Anirudh Phase II:      Anesthesia Post Evaluation    Patient location during evaluation: bedside  Patient participation: complete - patient participated  Level of consciousness: awake and alert  Pain score: 0  Airway patency: patent  Nausea & Vomiting: no nausea and no vomiting  Cardiovascular status: hemodynamically stable  Respiratory status: acceptable and room air  Hydration status: euvolemic  Pain management: adequate and satisfactory to patient        No notable events documented.

## 2025-02-09 NOTE — PLAN OF CARE
Problem: Discharge Planning  Goal: Discharge to home or other facility with appropriate resources  2/9/2025 0142 by Mari Johnson, RN  Outcome: Progressing  2/8/2025 1451 by Sera Shaffer, RN  Outcome: Progressing     Problem: Pain  Goal: Verbalizes/displays adequate comfort level or baseline comfort level  2/9/2025 0142 by Mari Johnson, RN  Outcome: Progressing  2/8/2025 1451 by Sera Shaffer, RN  Outcome: Progressing

## 2025-02-09 NOTE — PROGRESS NOTES
of viable baby boy at 1834. Delayed cord clamping completed. Skin to skin initiated immediately after delivery. APGARS 9/9.

## 2025-02-09 NOTE — PROGRESS NOTES
Patient admitted into room 308 and oriented to surroundings. Introduced self and wrote this RN's name and phone extension on patient's white board. Phone and nurse's call light at patient's bedside and instructed to use for any needs. Patient instructed on new admission informational packet at bedside with information on infant testing to be done when infant is 24 hours old including ODH labs, 24 hours blood sugar, and CCHD. Patient instructed on mom baby unit policies and procedures including need to keep infant in bassinet for transport in hallways and for infant to sleep alone, on back, in an empty bassinet. Patient also instructed patient on unit visitation policy and that one same support person 18 years old or older may stay overnight if desired. Patient verbalized understanding of all of the above. Pt refuses tdap and flu vaccines

## 2025-02-09 NOTE — PROGRESS NOTES
Postpartum Day 1: Vaginal Delivery    The patient feels well.  Pain is well controlled with current medications. Baby is feeding via breast.     Objective:        Vitals:    02/09/25 0716   BP: 95/63   Pulse: 64   Resp: 16   Temp: 98.1 °F (36.7 °C)   SpO2: 97%         Lab Results   Component Value Date    WBC 18.0 (H) 02/08/2025    HGB 10.9 (L) 02/09/2025    HCT 33.7 (L) 02/09/2025    MCV 96.4 02/08/2025     02/08/2025       General:    alert, appears stated age, and cooperative   Lochia:  appropriate   Uterine    firm   DVT Evaluation:  No evidence of DVT seen on physical exam.     Assessment:     Status post Vaginal Delivery.good    Plan:     Continue current care.

## 2025-02-09 NOTE — PROGRESS NOTES
[facility] [place] Hearing screening results were discussed with parent. Questions answered. Brochure given to parent. Advised to monitor developmental milestones and contact physician for any concerns.   Samira Vega, AuD

## 2025-02-10 VITALS
RESPIRATION RATE: 16 BRPM | OXYGEN SATURATION: 99 % | TEMPERATURE: 98.2 F | HEART RATE: 66 BPM | SYSTOLIC BLOOD PRESSURE: 90 MMHG | DIASTOLIC BLOOD PRESSURE: 53 MMHG

## 2025-02-10 PROCEDURE — 6370000000 HC RX 637 (ALT 250 FOR IP)

## 2025-02-10 RX ORDER — IBUPROFEN 800 MG/1
800 TABLET, FILM COATED ORAL EVERY 8 HOURS PRN
Qty: 30 TABLET | Refills: 0 | Status: SHIPPED | OUTPATIENT
Start: 2025-02-10

## 2025-02-10 RX ADMIN — IBUPROFEN 800 MG: 800 TABLET, FILM COATED ORAL at 08:57

## 2025-02-10 RX ADMIN — DOCUSATE SODIUM 100 MG: 100 CAPSULE, LIQUID FILLED ORAL at 08:57

## 2025-02-10 ASSESSMENT — PAIN DESCRIPTION - DESCRIPTORS: DESCRIPTORS: ACHING;CRAMPING;DISCOMFORT

## 2025-02-10 ASSESSMENT — PAIN DESCRIPTION - LOCATION: LOCATION: ABDOMEN;PERINEUM

## 2025-02-10 ASSESSMENT — PAIN - FUNCTIONAL ASSESSMENT: PAIN_FUNCTIONAL_ASSESSMENT: ACTIVITIES ARE NOT PREVENTED

## 2025-02-10 ASSESSMENT — PAIN DESCRIPTION - ORIENTATION: ORIENTATION: LOWER

## 2025-02-10 ASSESSMENT — PAIN SCALES - GENERAL
PAINLEVEL_OUTOF10: 2
PAINLEVEL_OUTOF10: 4

## 2025-02-10 NOTE — PLAN OF CARE
Problem: Discharge Planning  Goal: Discharge to home or other facility with appropriate resources  Outcome: Progressing     Problem: Pain  Goal: Verbalizes/displays adequate comfort level or baseline comfort level  2/10/2025 0013 by Mari Johnson RN  Outcome: Progressing  2/9/2025 1128 by Shayna Miranda, [place]  Flowsheets  Taken 2/9/2025 1128  Verbalizes/displays adequate comfort level or baseline comfort level:   Encourage patient to monitor pain and request assistance   Assess pain using appropriate pain scale  Taken 2/9/2025 0800  Verbalizes/displays adequate comfort level or baseline comfort level:   Encourage patient to monitor pain and request assistance   Assess pain using appropriate pain scale  Note: Pt declined PRN tylenol and motrin

## 2025-02-10 NOTE — PROGRESS NOTES
Postpartum Day 2: Vaginal Delivery    The patient feels well.  Pain is well controlled with current medications. Baby is feeding via breast.     Objective:        Vitals:    02/10/25 0634   BP: (!) 90/53   Pulse: 66   Resp: 16   Temp: 98.2 °F (36.8 °C)   SpO2: 99%         Lab Results   Component Value Date    WBC 18.0 (H) 02/08/2025    HGB 10.9 (L) 02/09/2025    HCT 33.7 (L) 02/09/2025    MCV 96.4 02/08/2025     02/08/2025       General:    alert, appears stated age, and cooperative   Lochia:  appropriate   Uterine    firm   DVT Evaluation:  No evidence of DVT seen on physical exam.     Assessment:     Status post Vaginal Delivery.good    Plan:     Discharge home with standard precautions and return to clinic in 4-6 weeks.

## 2025-02-10 NOTE — PROGRESS NOTES
Infant discharged to home in stable condition via car seat carried by mother on lap in wheelchair. Infant and mother accompanied by PCA.

## 2025-02-10 NOTE — PROGRESS NOTES
Patient instructed on discharge instructions with verbalized understanding. Questions answered prn.    Patient given a copy of her discharge instructions for her records.

## 2025-02-10 NOTE — DISCHARGE INSTRUCTIONS
Follow-up with your OB doctor in 1 week if  delivery or in 6 weeks for vaginal delivery unless otherwise instructed.   Call office for an appointment.    For breastfeeding support, you can contact our lactation specialists at [phone] or [phone]    DIET  Eat a well balanced diet focusing on foods high in fiber and protein  Drink plenty of fluids especially water.  To avoid constipation you may take a mild stool softener as recommended by your doctor or midwife.    ACTIVITY  Gradually increase your activity.  Resume exercise regimen only after advised by your doctor or midwife.  Avoid lifting anything heavier than your baby or a gallon of milk for SIX weeks.   Avoid driving until your doctor or midwife has given their approval.  Rise slowly from a lying to sitting and then a standing position.  Climb stairs one at a time.  Use caution when carrying your baby up and down the stairs.  No sexual activity for 6 weeks or until advised by your doctor - Nothing in vagina: intercourse, tampons, or douching.   Be prepared to discuss family planning at your follow-up OB visit.   You may feel tired or have a lack of energy.  You may continue your prenatal vitamin to replenish nutrients post delivery.  Nap when baby naps to catch up on sleep.  May return to work or school in 6 weeks or as directed by OB.     EMOTIONS  You may feed britt, sad, teary, & overwhelmed.  Contact your OB provider if you feel you may be showing signs of postpartum depression, or have thoughts of harming yourself or your infant.  If infant will not stop crying, contact another adult for help or place infant in their crib on their back and take a break.  NEVER shake your infant.      BLEEDING  Vaginal bleeding will decrease in amount over the next few weeks.  You will notice that as your activity increases, your flow may increase.  This is your body's way of telling you, you need to take things easier and rest more often.  Call your

## 2025-02-13 NOTE — DISCHARGE SUMMARY
Obstetrical Discharge Form    Patient ID:  Angella Smith  [id number]  29 y.o.  1995    Admit date: 2025    Discharge date: 2/10/2025     Admitting Physician: Dr. Harvey     Discharge Diagnoses: Multiparity [Z64.1]    Final Diagnosis:   Principal Problem:    Multiparity  Resolved Problems:    * No resolved hospital problems. *      Discharged Condition: good      Gestational Age:39w5d    Antepartum complications: none    Date of Delivery: 2025    Type of Delivery: vaginal, spontaneous    Delivered By: Aurelia Booker CNM          Baby:     Information for the patient's :  Julio Smith [[id number]]        Anesthesia: Epidural    Intrapartum complications: None    Feeding method: breast    Hospital Course: Uneventful.  Patient recovered well without any issues     Discharged Condition: stable to home    Discharge Medication:      Medication List        START taking these medications      ibuprofen 800 MG tablet  Commonly known as: ADVIL;MOTRIN  Take 1 tablet by mouth every 8 hours as needed for Pain            CONTINUE taking these medications      [place]-3 Complex [id number] MG-MG-UNIT Caps  Generic drug: DHA-EPA-Vitamin E     PNV with Ca,No.73-Iron-FA Tabs  Take 1 tablet by mouth daily               Where to Get Your Medications        These medications were sent to [id number] - [place], [place] - [address] - P [phone] - F [phone]  [facility] [place] [address]      Phone: [phone]   ibuprofen 800 MG tablet          Postpartum complications: none    Note that over 30 minutes was spent in preparing discharge papers, discussing discharge with patient, medication review, etc.    Discharge Date: 2/10/2025    Plan:   Follow up in 6 week(s)